# Patient Record
(demographics unavailable — no encounter records)

---

## 2025-03-25 NOTE — HISTORY OF PRESENT ILLNESS
[Father] : father [Grade: ____] : Grade: [unfilled] [Normal Performance] : normal performance [Normal Behavior/Attention] : normal behavior/attention [Normal Homework] : normal homework [Eats regular meals including adequate fruits and vegetables] : eats regular meals including adequate fruits and vegetables [Drinks non-sweetened liquids] : drinks non-sweetened liquids  [Calcium source] : calcium source [Has friends] : has friends [At least 1 hour of physical activity a day] : at least 1 hour of physical activity a day [Yes] : Patient goes to dentist yearly [Up to date] : Up to date [Eats meals with family] : eats meals with family [No] : Patient has not had sexual intercourse [Sexual Orientation: _______] : [unfilled] [Gender Identification: _______] : [unfilled] [Has ways to cope with stress] : has ways to cope with stress [Displays self-confidence] : displays self-confidence [Has peer relationships free of violence] : has peer relationships free of violence [With Teen] : teen [Has concerns about body or appearance] : does not have concerns about body or appearance [Uses electronic nicotine delivery system] : does not use electronic nicotine delivery system [Uses tobacco] : does not use tobacco [Uses drugs] : does not use drugs  [Drinks alcohol] : does not drink alcohol [Has problems with sleep] : does not have problems with sleep [Gets depressed, anxious, or irritable/has mood swings] : does not get depressed, anxious, or irritable/has mood swings [Has thought about hurting self or considered suicide] : has not thought about hurting self or considered suicide [FreeTextEntry7] : NORMAL BLOODWORK LAST YEAR.  IMPROVED BMI SINCE THEN.  [de-identified] : GOES TO BED LATE, NO ISSUES WITH SLEEP INITIATION OR MAINTENANCE [de-identified] : [address], [de-identified] : EATING MUCH HEALTHIER [de-identified] : LIKES ART, DRAWING, CALLIGRAPHY, MAP MAKING [de-identified] : GENERAL MOOD IS "OK", DENIES DEPRESSION, DENIES THOUGHTS OF SELF HARM

## 2025-03-25 NOTE — DISCUSSION/SUMMARY
[Normal Growth] : growth [Normal Development] : development  [No Elimination Concerns] : elimination [Continue Regimen] : feeding [No Skin Concerns] : skin [Normal Sleep Pattern] : sleep [None] : no medical problems [Anticipatory Guidance Given] : Anticipatory guidance addressed as per the history of present illness section [Physical Growth and Development] : physical growth and development [Social and Academic Competence] : social and academic competence [Emotional Well-Being] : emotional well-being [Risk Reduction] : risk reduction [Violence and Injury Prevention] : violence and injury prevention [No Medications] : ~He/She~ is not on any medications [Patient] : patient [Parent/Guardian] : Parent/Guardian [] : The components of the vaccine(s) to be administered today are listed in the plan of care. The disease(s) for which the vaccine(s) are intended to prevent and the risks have been discussed with the caretaker.  The risks are also included in the appropriate vaccination information statements which have been provided to the patient's caregiver.  The caregiver has given consent to vaccinate. [Full Activity without restrictions including Physical Education & Athletics] : Full Activity without restrictions including Physical Education & Athletics [FreeTextEntry1] : Vaccine(s) given today: GARDISIL  The potential side effects of today's vaccine(s) and the risks of disease(s) which they are intended to prevent have been discussed with the caretaker.  The caretaker has given consent to vaccinate.  FATHER DECLINES FLU VACCINE TODAY DESPITE COUNSELING

## 2025-03-25 NOTE — HISTORY OF PRESENT ILLNESS
[Father] : father [Grade: ____] : Grade: [unfilled] [Normal Performance] : normal performance [Normal Behavior/Attention] : normal behavior/attention [Normal Homework] : normal homework [Eats regular meals including adequate fruits and vegetables] : eats regular meals including adequate fruits and vegetables [Drinks non-sweetened liquids] : drinks non-sweetened liquids  [Calcium source] : calcium source [Has friends] : has friends [At least 1 hour of physical activity a day] : at least 1 hour of physical activity a day [Yes] : Patient goes to dentist yearly [Up to date] : Up to date [Eats meals with family] : eats meals with family [No] : Patient has not had sexual intercourse [Sexual Orientation: _______] : [unfilled] [Gender Identification: _______] : [unfilled] [Has ways to cope with stress] : has ways to cope with stress [Displays self-confidence] : displays self-confidence [Has peer relationships free of violence] : has peer relationships free of violence [With Teen] : teen [Has concerns about body or appearance] : does not have concerns about body or appearance [Uses electronic nicotine delivery system] : does not use electronic nicotine delivery system [Uses tobacco] : does not use tobacco [Uses drugs] : does not use drugs  [Drinks alcohol] : does not drink alcohol [Has problems with sleep] : does not have problems with sleep [Gets depressed, anxious, or irritable/has mood swings] : does not get depressed, anxious, or irritable/has mood swings [Has thought about hurting self or considered suicide] : has not thought about hurting self or considered suicide [FreeTextEntry7] : NORMAL BLOODWORK LAST YEAR.  IMPROVED BMI SINCE THEN.  [de-identified] : GOES TO BED LATE, NO ISSUES WITH SLEEP INITIATION OR MAINTENANCE [de-identified] : [address], [de-identified] : EATING MUCH HEALTHIER [de-identified] : LIKES ART, DRAWING, CALLIGRAPHY, MAP MAKING [de-identified] : GENERAL MOOD IS "OK", DENIES DEPRESSION, DENIES THOUGHTS OF SELF HARM

## 2025-03-25 NOTE — PHYSICAL EXAM

## 2025-03-25 NOTE — PHYSICAL EXAM
